# Patient Record
Sex: MALE | Race: WHITE | Employment: FULL TIME | URBAN - METROPOLITAN AREA
[De-identification: names, ages, dates, MRNs, and addresses within clinical notes are randomized per-mention and may not be internally consistent; named-entity substitution may affect disease eponyms.]

---

## 2021-11-06 ENCOUNTER — HOSPITAL ENCOUNTER (EMERGENCY)
Age: 36
Discharge: HOME | End: 2021-11-06
Attending: STUDENT IN AN ORGANIZED HEALTH CARE EDUCATION/TRAINING PROGRAM
Payer: COMMERCIAL

## 2021-11-06 VITALS
HEIGHT: 73 IN | SYSTOLIC BLOOD PRESSURE: 132 MMHG | TEMPERATURE: 98.2 F | OXYGEN SATURATION: 99 % | HEART RATE: 96 BPM | BODY MASS INDEX: 26.41 KG/M2 | DIASTOLIC BLOOD PRESSURE: 74 MMHG | WEIGHT: 199.3 LBS | RESPIRATION RATE: 18 BRPM

## 2021-11-06 DIAGNOSIS — R13.10 DYSPHAGIA, UNSPECIFIED TYPE: Primary | ICD-10-CM

## 2021-11-06 PROCEDURE — 99283 EMERGENCY DEPT VISIT LOW MDM: CPT

## 2021-11-06 PROCEDURE — 99282 EMERGENCY DEPT VISIT SF MDM: CPT

## 2021-11-06 RX ORDER — ATENOLOL 25 MG/1
25 TABLET ORAL DAILY
COMMUNITY

## 2021-11-06 NOTE — ED NOTES
Discharge instructions reviewed with pt and copy given by this RN. Patient advised to follow up with PCP and GI MD. Patient verbalized understanding of all education and is ambulatory from ED in no sign of distress or discomfort.

## 2021-11-06 NOTE — DISCHARGE INSTRUCTIONS
You presented to the ED shortly after having difficulty swallowing food. You felt you cleared most of the food but also had a globus sensation or a sensation difficulty swallowing. You are tolerating secretions able drink water while in the ED. Most likely your food bolus was cleared and you are feeling irritation from the previous food impaction. Recommend close follow-up with GI. Use an emery discharge paperwork to contact GI. Reviewed food bolus impaction in your discharge paperwork. You may also follow-up with your PCP who can give you referral to GI.

## 2021-11-06 NOTE — ED TRIAGE NOTES
Pt reports about 1.5 hours ago he was eating a chicken sandwich at [employer] and felt like he couldn't swallow and that something was stuck in his throat. Pt is managing secretions well and oxygen saturation WNL and is talking with this RN during triage. Pt reports he feels a little bit better and he coughed up what seemed to be stuck in his throat.

## 2021-11-06 NOTE — ED PROVIDER NOTES
Patient is a 59-year-old male presenting to the ED with some discomfort with swallowing. Patient was in a check for a approximately 1 to 2 hours before arrival when he felt he had food stuck in his throat. Patient was able to clear her for was stuck there but still has residual sensation of irritation and discomfort. Patient has no known history of esophageal disease or strictures but has had periodic episodes of the sensation of food being stuck in his throat. Patient has never seen GI or ever had a EGD. Patient's ABCs are intact at this time, tolerating secretions and in no acute distress. The history is provided by the patient and the spouse. Other  This is a recurrent problem. The current episode started 1 to 2 hours ago. The problem occurs constantly. The problem has been rapidly improving. Pertinent negatives include no chest pain, no abdominal pain, no headaches and no shortness of breath. The symptoms are aggravated by swallowing. The symptoms are relieved by rest. He has tried nothing for the symptoms. The treatment provided no relief. History reviewed. No pertinent past medical history. No past surgical history on file. History reviewed. No pertinent family history.     Social History     Socioeconomic History    Marital status: Not on file     Spouse name: Not on file    Number of children: Not on file    Years of education: Not on file    Highest education level: Not on file   Occupational History    Not on file   Tobacco Use    Smoking status: Not on file    Smokeless tobacco: Not on file   Substance and Sexual Activity    Alcohol use: Not on file    Drug use: Not on file    Sexual activity: Not on file   Other Topics Concern    Not on file   Social History Narrative    Not on file     Social Determinants of Health     Financial Resource Strain:     Difficulty of Paying Living Expenses: Not on file   Food Insecurity:     Worried About [address] in the Last Year: Not on file    Ran Out of Food in the Last Year: Not on file   Transportation Needs:     Lack of Transportation (Medical): Not on file    Lack of Transportation (Non-Medical): Not on file   Physical Activity:     Days of Exercise per Week: Not on file    Minutes of Exercise per Session: Not on file   Stress:     Feeling of Stress : Not on file   Social Connections:     Frequency of Communication with Friends and Family: Not on file    Frequency of Social Gatherings with Friends and Family: Not on file    Attends Confucianism Services: Not on file    Active Member of [address] [place] [address] Isabella [employer] or Organizations: Not on file    Attends Club or Organization Meetings: Not on file    Marital Status: Not on file   Intimate Partner Violence:     Fear of Current or Ex-Partner: Not on file    Emotionally Abused: Not on file    Physically Abused: Not on file    Sexually Abused: Not on file   Housing Stability:     Unable to Pay for Housing in the Last Year: Not on file    Number of [place] in the Last Year: Not on file    Unstable Housing in the Last Year: Not on file         ALLERGIES: Patient has no allergy information on record. Review of Systems   Constitutional: Negative. HENT: Positive for trouble swallowing. Eyes: Negative. Respiratory: Negative. Negative for shortness of breath. Cardiovascular: Negative. Negative for chest pain. Gastrointestinal: Negative. Negative for abdominal pain. Endocrine: Negative. Genitourinary: Negative. Musculoskeletal: Negative. Skin: Negative. Allergic/Immunologic: Negative. Neurological: Negative. Negative for headaches. Hematological: Negative. Psychiatric/Behavioral: Negative. There were no vitals filed for this visit. Physical Exam  Vitals and nursing note reviewed. Constitutional:       General: He is not in acute distress. Appearance: Normal appearance. He is not ill-appearing.    HENT:      Head: Normocephalic and atraumatic. Right Ear: External ear normal.      Left Ear: External ear normal.      Nose: Nose normal.      Mouth/Throat:      Mouth: Mucous membranes are moist.      Pharynx: Oropharynx is clear. No posterior oropharyngeal erythema. Eyes:      Extraocular Movements: Extraocular movements intact. Conjunctiva/sclera: Conjunctivae normal.   Cardiovascular:      Rate and Rhythm: Normal rate. Pulses: Normal pulses. Heart sounds: Normal heart sounds. Pulmonary:      Effort: Pulmonary effort is normal.      Breath sounds: Normal breath sounds. Chest:      Chest wall: No deformity or tenderness. Abdominal:      General: Abdomen is flat. There is no distension. Tenderness: There is no abdominal tenderness. Musculoskeletal:         General: No deformity or signs of injury. Normal range of motion. Cervical back: Normal range of motion and neck supple. No tenderness. Skin:     General: Skin is warm and dry. Capillary Refill: Capillary refill takes less than 2 seconds. Neurological:      General: No focal deficit present. Mental Status: He is alert and oriented to person, place, and time. Psychiatric:         Mood and Affect: Mood normal.         Behavior: Behavior normal.          MDM       Differential diagnosis: Esophagitis, food bolus impaction, esophageal irritation    IMAGING RESULTS:  No orders to display       MEDICATIONS GIVEN:  Medications - No data to display    MDM: Patient is a 29-year-old male presented to the ED after having food stuck in his throat that he was able to clear who is currently tolerating secretions and liquids by mouth without difficulty. Patient feels that his symptoms have resolved or significantly improved. At this time no clinical signs of food bolus impaction or dysphagia. Patient was observed for approximately 2 and half hours without any airway compromise or difficulty swallowing.  Patient stable for discharge with strict return precautions and recommend outpatient follow-up with GI    Key Discharge Instructions and summary of care: You presented to the ED shortly after having difficulty swallowing food. You felt you cleared most of the food but also had a globus sensation or a sensation difficulty swallowing. You are tolerating secretions able drink water while in the ED. Most likely your food bolus was cleared and you are feeling irritation from the previous food impaction. Recommend close follow-up with GI. Use an emery discharge paperwork to contact GI. Reviewed food bolus impaction in your discharge paperwork. You may also follow-up with your PCP who can give you referral to GI.      ED Impression:    ICD-10-CM ICD-9-CM    1.  Dysphagia, unspecified type  R13.10 787.20         Disposition: Discharged      Procedures

## 2023-05-15 RX ORDER — ATENOLOL 25 MG/1
25 TABLET ORAL DAILY
COMMUNITY